# Patient Record
Sex: FEMALE | Race: BLACK OR AFRICAN AMERICAN
[De-identification: names, ages, dates, MRNs, and addresses within clinical notes are randomized per-mention and may not be internally consistent; named-entity substitution may affect disease eponyms.]

---

## 2017-08-15 PROBLEM — Z00.00 ENCOUNTER FOR PREVENTIVE HEALTH EXAMINATION: Status: ACTIVE | Noted: 2017-08-15

## 2017-10-23 ENCOUNTER — APPOINTMENT (OUTPATIENT)
Dept: GASTROENTEROLOGY | Facility: CLINIC | Age: 77
End: 2017-10-23

## 2020-12-07 ENCOUNTER — APPOINTMENT (OUTPATIENT)
Dept: GASTROENTEROLOGY | Facility: CLINIC | Age: 80
End: 2020-12-07

## 2022-06-30 ENCOUNTER — APPOINTMENT (OUTPATIENT)
Dept: OTOLARYNGOLOGY | Facility: CLINIC | Age: 82
End: 2022-06-30

## 2022-06-30 VITALS
SYSTOLIC BLOOD PRESSURE: 137 MMHG | OXYGEN SATURATION: 99 % | BODY MASS INDEX: 26.68 KG/M2 | DIASTOLIC BLOOD PRESSURE: 75 MMHG | HEIGHT: 62 IN | WEIGHT: 145 LBS | HEART RATE: 79 BPM

## 2022-06-30 DIAGNOSIS — R07.0 PAIN IN THROAT: ICD-10-CM

## 2022-06-30 DIAGNOSIS — R09.82 POSTNASAL DRIP: ICD-10-CM

## 2022-06-30 DIAGNOSIS — K21.9 GASTRO-ESOPHAGEAL REFLUX DISEASE W/OUT ESOPHAGITIS: ICD-10-CM

## 2022-06-30 PROCEDURE — 99204 OFFICE O/P NEW MOD 45 MIN: CPT | Mod: 25

## 2022-06-30 PROCEDURE — 31231 NASAL ENDOSCOPY DX: CPT

## 2022-06-30 RX ORDER — BLOOD SUGAR DIAGNOSTIC
STRIP MISCELLANEOUS
Qty: 100 | Refills: 0 | Status: ACTIVE | COMMUNITY
Start: 2022-06-08

## 2022-06-30 RX ORDER — ATORVASTATIN CALCIUM 20 MG/1
20 TABLET, FILM COATED ORAL
Qty: 90 | Refills: 0 | Status: ACTIVE | COMMUNITY
Start: 2022-06-09

## 2022-06-30 RX ORDER — AZITHROMYCIN 250 MG/1
250 TABLET, FILM COATED ORAL
Qty: 6 | Refills: 0 | Status: COMPLETED | COMMUNITY
Start: 2022-06-01

## 2022-06-30 RX ORDER — FAMOTIDINE 20 MG/1
20 TABLET ORAL TWICE DAILY
Qty: 120 | Refills: 0 | Status: ACTIVE | COMMUNITY
Start: 2022-06-30 | End: 1900-01-01

## 2022-06-30 RX ORDER — AMLODIPINE AND OLMESARTAN MEDOXOMIL 10; 40 MG/1; MG/1
10-40 TABLET ORAL
Qty: 90 | Refills: 0 | Status: ACTIVE | COMMUNITY
Start: 2022-06-09

## 2022-07-01 NOTE — PROCEDURE
[FreeTextEntry6] : Flexible scope #201 was used. Right nasal passage with normal inferior, middle and superior turbinates. Nasal passage patent with clear middle meatus and sphenoethmoid recess. Left nasal passage with normal inferior, middle and superior turbinates. Nasal passage was patent with clear middle meatus and sphenoethmoid recess. No mucopurulence or polyps appreciated. Nasopharynx clear.  Pos PND\par \par  [de-identified] : Flexible scope #201 used. Passed through nasal passage and nasopharynx/oropharynx/hypopharynx clear. Supraglottis normal. Glottis with fully mobile vocal cords without lesions or masses. Visualized subglottis clear. Postcricoid area with erythema and mild edema.  Mild interarytenoid pachydermia. No pooling of secretions.\par \par

## 2022-07-01 NOTE — ASSESSMENT
[FreeTextEntry1] : Throat discomfort with LPRD:\par - Reflux precautions, Handout given. \par - Famotidine BID x  month, then Daily for one month\par - F/U PRN if symptoms do not resolve\par \par \par I personally saw and examined Ms. JESSI VALENTE in detail this visit today. I personally reviewed the HPI, PMH, FH, SH, ROS and medications/allergies. I have spoken to ALESHA Sierra regarding the history and have personally determined the assessment and plan of care, and documented this myself. I was present and participated in all key portions of the encounter and all procedures noted above. I have made changes in the body of the note where appropriate.\par \par Attesting Faculty: See Attending Signature Below

## 2022-07-01 NOTE — HISTORY OF PRESENT ILLNESS
[de-identified] : 82yo female with throat discomfort for the past three months. All the time. She also notes postnasal drip.  No nasal congestion.  No trouble eating or drinking.  Pos occasional GERD\par Notes PMD put her on Z-Jorge and notes it helped throat discomfort a little.

## 2023-07-06 ENCOUNTER — OFFICE (OUTPATIENT)
Facility: LOCATION | Age: 83
Setting detail: OPHTHALMOLOGY
End: 2023-07-06
Payer: MEDICARE

## 2023-07-06 DIAGNOSIS — H16.223: ICD-10-CM

## 2023-07-06 DIAGNOSIS — H40.013: ICD-10-CM

## 2023-07-06 DIAGNOSIS — G24.5: ICD-10-CM

## 2023-07-06 DIAGNOSIS — H02.834: ICD-10-CM

## 2023-07-06 DIAGNOSIS — H11.043: ICD-10-CM

## 2023-07-06 DIAGNOSIS — H02.831: ICD-10-CM

## 2023-07-06 PROBLEM — H02.83 DERMATOCHALASIS: Status: ACTIVE | Noted: 2023-07-06

## 2023-07-06 PROCEDURE — 92004 COMPRE OPH EXAM NEW PT 1/>: CPT | Performed by: OPHTHALMOLOGY

## 2023-07-06 ASSESSMENT — REFRACTION_AUTOREFRACTION
OD_CYLINDER: -1.00
OD_SPHERE: +0.75
OD_AXIS: 086
OS_SPHERE: +0.25
OS_CYLINDER: -1.50
OS_AXIS: 081

## 2023-07-06 ASSESSMENT — KERATOMETRY
OD_K1POWER_DIOPTERS: 42.75
OS_K2POWER_DIOPTERS: 41.75
OD_AXISANGLE_DEGREES: 000
OD_K2POWER_DIOPTERS: 42.50
OS_AXISANGLE_DEGREES: 078
OS_K1POWER_DIOPTERS: 42.75

## 2023-07-06 ASSESSMENT — CORNEAL PTERYGIUM
OS_PTERYGIUM: NASAL 1MM
OD_PTERYGIUM: NASAL 1MM

## 2023-07-06 ASSESSMENT — SPHEQUIV_DERIVED
OS_SPHEQUIV: -0.5
OD_SPHEQUIV: 0.25

## 2023-07-06 ASSESSMENT — VISUAL ACUITY
OS_BCVA: 20/25-2
OD_BCVA: 20/30-2

## 2023-07-06 ASSESSMENT — SUPERFICIAL PUNCTATE KERATITIS (SPK)
OD_SPK: 2+
OS_SPK: 3+

## 2023-07-06 ASSESSMENT — LID POSITION - DERMATOCHALASIS
OD_DERMATOCHALASIS: 2+
OS_DERMATOCHALASIS: 2+

## 2023-07-06 ASSESSMENT — AXIALLENGTH_DERIVED
OS_AL: 24.2645
OD_AL: 23.818

## 2023-07-06 ASSESSMENT — TONOMETRY
OD_IOP_MMHG: 15
OS_IOP_MMHG: 17

## 2023-11-14 ENCOUNTER — OFFICE (OUTPATIENT)
Facility: LOCATION | Age: 83
Setting detail: OPHTHALMOLOGY
End: 2023-11-14
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
## 2023-11-14 PROCEDURE — NO SHOW FE NO SHOW FEE: Performed by: OPHTHALMOLOGY

## 2024-07-12 ENCOUNTER — APPOINTMENT (OUTPATIENT)
Dept: OTOLARYNGOLOGY | Facility: CLINIC | Age: 84
End: 2024-07-12

## 2024-08-08 ENCOUNTER — TRANSCRIPTION ENCOUNTER (OUTPATIENT)
Age: 84
End: 2024-08-08

## 2024-08-08 ENCOUNTER — APPOINTMENT (OUTPATIENT)
Dept: OTOLARYNGOLOGY | Facility: CLINIC | Age: 84
End: 2024-08-08

## 2024-08-08 ENCOUNTER — NON-APPOINTMENT (OUTPATIENT)
Age: 84
End: 2024-08-08

## 2024-08-08 PROBLEM — Z85.038 HISTORY OF MALIGNANT NEOPLASM OF COLON: Status: RESOLVED | Noted: 2024-08-08 | Resolved: 2024-08-08

## 2024-08-08 PROBLEM — I10 HYPERTENSION: Status: ACTIVE | Noted: 2024-08-08

## 2024-08-08 PROBLEM — H90.3 BILATERAL SENSORINEURAL HEARING LOSS: Status: ACTIVE | Noted: 2024-08-08

## 2024-08-08 PROBLEM — E78.5 HYPERLIPIDEMIA: Status: ACTIVE | Noted: 2024-08-08

## 2024-08-08 PROCEDURE — 92557 COMPREHENSIVE HEARING TEST: CPT

## 2024-08-08 PROCEDURE — 92567 TYMPANOMETRY: CPT

## 2024-08-08 PROCEDURE — 99213 OFFICE O/P EST LOW 20 MIN: CPT | Mod: 25

## 2024-08-08 PROCEDURE — G0268 REMOVAL OF IMPACTED WAX MD: CPT

## 2024-08-08 NOTE — END OF VISIT
[FreeTextEntry3] : I, Dr. Pfeiffer personally performed the evaluation and management (E/M) services , including all procedures, for this established patient who presents today with (a) new problem(s)/exacerbation of (an) existing condition(s). That E/M includes conducting the clinically appropriate interval history &/or exam, assessing all new/exacerbated conditions, and establishing a new plan of care. Today, my MEDARDO, Urszula Smith, was here to observe &/or participate in the visit & follow plan of care established by me.

## 2024-08-08 NOTE — HISTORY OF PRESENT ILLNESS
[de-identified] : Patient comes in today with known hearing issues, wears hearing aids right now but looking into getting a new pair. She does not have any pain in the ears, dizziness or other ear issues. She does have some bells ringing in the head and sometimes its louder than other times. She denies issues with nasal congestion or runny nose

## 2024-08-08 NOTE — ASSESSMENT
[FreeTextEntry1] : Patient has hearing aids wants to get her hearing checked had wax curetted out audiogram performed recommend continue using hearing aids follow-up with us as needed.

## 2024-08-08 NOTE — PHYSICAL EXAM
[Midline] : trachea located in midline position [Normal] : no rashes [de-identified] : cerumen in the ear canals; once removed normal EACs

## 2025-05-13 ENCOUNTER — APPOINTMENT (OUTPATIENT)
Dept: OTOLARYNGOLOGY | Facility: CLINIC | Age: 85
End: 2025-05-13
Payer: MEDICARE

## 2025-05-13 VITALS
BODY MASS INDEX: 26.65 KG/M2 | HEIGHT: 61.5 IN | SYSTOLIC BLOOD PRESSURE: 132 MMHG | HEART RATE: 84 BPM | WEIGHT: 143 LBS | DIASTOLIC BLOOD PRESSURE: 70 MMHG | TEMPERATURE: 97.6 F

## 2025-05-13 DIAGNOSIS — K21.9 GASTRO-ESOPHAGEAL REFLUX DISEASE W/OUT ESOPHAGITIS: ICD-10-CM

## 2025-05-13 DIAGNOSIS — H61.23 IMPACTED CERUMEN, BILATERAL: ICD-10-CM

## 2025-05-13 DIAGNOSIS — R49.0 DYSPHONIA: ICD-10-CM

## 2025-05-13 DIAGNOSIS — R05.3 CHRONIC COUGH: ICD-10-CM

## 2025-05-13 PROCEDURE — 31575 DIAGNOSTIC LARYNGOSCOPY: CPT

## 2025-05-13 PROCEDURE — 99214 OFFICE O/P EST MOD 30 MIN: CPT | Mod: 25

## 2025-05-13 PROCEDURE — 69210 REMOVE IMPACTED EAR WAX UNI: CPT

## 2025-05-13 RX ORDER — FLUTICASONE PROPIONATE 50 UG/1
50 SPRAY, METERED NASAL DAILY
Qty: 1 | Refills: 3 | Status: ACTIVE | COMMUNITY
Start: 2025-05-13 | End: 1900-01-01